# Patient Record
Sex: MALE | Race: WHITE | Employment: OTHER | URBAN - METROPOLITAN AREA
[De-identification: names, ages, dates, MRNs, and addresses within clinical notes are randomized per-mention and may not be internally consistent; named-entity substitution may affect disease eponyms.]

---

## 2019-06-24 ENCOUNTER — OFFICE VISIT (OUTPATIENT)
Dept: FAMILY MEDICINE CLINIC | Age: 59
End: 2019-06-24
Payer: COMMERCIAL

## 2019-06-24 VITALS
WEIGHT: 186 LBS | BODY MASS INDEX: 27.55 KG/M2 | SYSTOLIC BLOOD PRESSURE: 122 MMHG | HEIGHT: 69 IN | TEMPERATURE: 98.1 F | OXYGEN SATURATION: 98 % | DIASTOLIC BLOOD PRESSURE: 70 MMHG | HEART RATE: 76 BPM

## 2019-06-24 DIAGNOSIS — Z96.643 HISTORY OF BILATERAL HIP REPLACEMENTS: ICD-10-CM

## 2019-06-24 DIAGNOSIS — Z76.89 ENCOUNTER TO ESTABLISH CARE WITH NEW DOCTOR: Primary | ICD-10-CM

## 2019-06-24 DIAGNOSIS — I10 ESSENTIAL HYPERTENSION: ICD-10-CM

## 2019-06-24 PROCEDURE — 99203 OFFICE O/P NEW LOW 30 MIN: CPT | Performed by: FAMILY MEDICINE

## 2019-06-24 PROCEDURE — 1036F TOBACCO NON-USER: CPT | Performed by: FAMILY MEDICINE

## 2019-06-24 PROCEDURE — G8419 CALC BMI OUT NRM PARAM NOF/U: HCPCS | Performed by: FAMILY MEDICINE

## 2019-06-24 PROCEDURE — G8427 DOCREV CUR MEDS BY ELIG CLIN: HCPCS | Performed by: FAMILY MEDICINE

## 2019-06-24 PROCEDURE — 3017F COLORECTAL CA SCREEN DOC REV: CPT | Performed by: FAMILY MEDICINE

## 2019-06-24 RX ORDER — LISINOPRIL 10 MG/1
10 TABLET ORAL DAILY
Qty: 30 TABLET | Refills: 2 | Status: SHIPPED | OUTPATIENT
Start: 2019-06-24

## 2019-06-24 RX ORDER — LISINOPRIL 20 MG/1
20 TABLET ORAL DAILY
COMMUNITY
End: 2019-06-24

## 2019-06-24 SDOH — HEALTH STABILITY: MENTAL HEALTH: HOW OFTEN DO YOU HAVE A DRINK CONTAINING ALCOHOL?: MONTHLY OR LESS

## 2019-06-24 ASSESSMENT — PATIENT HEALTH QUESTIONNAIRE - PHQ9
SUM OF ALL RESPONSES TO PHQ9 QUESTIONS 1 & 2: 0
1. LITTLE INTEREST OR PLEASURE IN DOING THINGS: 0
SUM OF ALL RESPONSES TO PHQ QUESTIONS 1-9: 0
2. FEELING DOWN, DEPRESSED OR HOPELESS: 0
SUM OF ALL RESPONSES TO PHQ QUESTIONS 1-9: 0

## 2019-06-24 NOTE — PROGRESS NOTES
[facility]  Office Progress Note - Dr. Bre Nelson  6/24/19    Chief Complaint   Patient presents with    [facility]        S:   Patient presents to [facility] care. Hypertension  Follow-up  Blood pressure is controlled today. BP Readings from Last 3 Encounters:   06/24/19 122/70   He is taking 20 mg of lisinopril every second or third day. He said splitting the pill seemed to still occasionally cause him some low blood pressure symptoms. Denies CP, sob, abd pain, headaches, vision changes, dizziness, hypotensive symptoms. No side effects from medications noted. Chronic hip pains   Degenerative joint disease, believe probably secondary to extensive wrestling history. He had bilateral hip replacements from April 2017 through April 2018. He also had a knee surgery. Had initial surgery, reaction to the anesthesia (and possibly lisinopril), ended up with very low BP, ended up in the ICU, recovered. Took a lot out of him. Felt like mood changed a lot after surgery. 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 Relationship suffered. Ultimately ended with his girlfriend/fiancé ending the relationship. Then mood evened out. [facility] called it Post-op adjustment disorder / and also wondering about reaction to the opiates. No other specific concerns or problems today.     Family History   Problem Relation Age of Onset    Other Mother         Myotonic dystrophy 2 (patient does not have)    High Blood Pressure Father        Past Surgical History:   Procedure Laterality Date    JOINT REPLACEMENT Bilateral     hip     KNEE CARTILAGE SURGERY Right        Social History     Tobacco Use    Smoking status: Never Smoker    Smokeless tobacco: Never Used   Substance Use Topics    Alcohol use: Not Currently     Frequency: Monthly or less    Drug use: Never       ROS:  No CP, No palpitations,   No sob, No cough,   No abd pain, No heartburn,   No headaches,   No tingling, No numbness, No weakness,   No bowel changes, No hematochezia, No melena,  No bladder changes, No hematuria  No skin rashes, No skin lesions. No vision changes, No hearing changes,   No polyuria, polydipsia, polyphagia. Stable mood. ROS otherwise negative unless as listed in HPI. Chart reviewed and updated where appropriate for PMH, Fam, and Soc Hx. Physical Exam   /70 (Site: Right Upper Arm, Position: Sitting, Cuff Size: Large Adult)   Pulse 76   Temp 98.1 °F (36.7 °C) (Oral)   Ht 5' 9\" (1.753 m)   Wt 186 lb (84.4 kg)   SpO2 98%   BMI 27.47 kg/m²   Wt Readings from Last 3 Encounters:   06/24/19 186 lb (84.4 kg)       Constitutional:    He is oriented to person, place, and time. He appears well-developed and well-nourished. HENT:    Nose: Nose normal.    Mouth/Throat: Oropharynx is clear and moist.   Eyes:    Conjunctivae are normal.    Pupils are equal, round, and reactive to light. EOMI. Neck:    Normal range of motion. No thyromegaly or nodules noted. No bruit. Cardiovascular:    Normal rate, regular rhythm and normal heart sounds. No murmur. No gallop and no friction rub. Pulmonary/Chest:    Effort normal and breath sounds normal.    No wheezes. No rales or rhonchi. Abdominal:    Soft. Bowel sounds are normal.    No distension. No tenderness. Musculoskeletal:    Transitions positions frequently. Sitting to standing. No joint swelling noted. No peripheral edema. Skin:    Skin is warm and dry. No rashes, lesions. Psychiatric:    He has a normal mood and affect. Normal groom and dress. No current outpatient medications on file prior to visit. No current facility-administered medications on file prior to visit. Patient Active Problem List   Diagnosis Code    History of bilateral hip replacements Z96.643    Essential hypertension I10       Christopher / Gueroinez Barnett was seen today for establish care.     Diagnoses and all orders for this visit:    Encounter to establish care with new doctor    Essential hypertension  -     lisinopril (PRINIVIL;ZESTRIL) 10 MG tablet; Take 1 tablet by mouth daily For blood pressure. Lower dose of lisinopril from 20 mg to 10 mg due to perceived hypertensive symptoms at 20 mg daily. Compliance has been poor secondary to that problem. History of bilateral hip replacements  Doing better than previous, but still with pains. He may need a local orthopedic surgeon. Declines colon cancer screening at this time. Patient counseled to follow up sooner or seek more acute care if symptoms worsening. Electronically signed by Yong Riddle MD on 6/24/2019    This note may have been created using dictation software.  Efforts were made to reduce grammatical or syntax errors, but some may persist.

## 2019-07-22 ENCOUNTER — TELEPHONE (OUTPATIENT)
Dept: FAMILY MEDICINE CLINIC | Age: 59
End: 2019-07-22

## 2019-08-15 ENCOUNTER — OFFICE VISIT (OUTPATIENT)
Dept: FAMILY MEDICINE CLINIC | Age: 59
End: 2019-08-15
Payer: COMMERCIAL

## 2019-08-15 VITALS
SYSTOLIC BLOOD PRESSURE: 100 MMHG | DIASTOLIC BLOOD PRESSURE: 64 MMHG | HEIGHT: 69 IN | BODY MASS INDEX: 27.11 KG/M2 | HEART RATE: 77 BPM | WEIGHT: 183 LBS | OXYGEN SATURATION: 98 % | TEMPERATURE: 98.3 F

## 2019-08-15 DIAGNOSIS — G89.29 CHRONIC PAIN OF RIGHT KNEE: Primary | ICD-10-CM

## 2019-08-15 DIAGNOSIS — M25.561 CHRONIC PAIN OF RIGHT KNEE: Primary | ICD-10-CM

## 2019-08-15 PROCEDURE — 3017F COLORECTAL CA SCREEN DOC REV: CPT | Performed by: FAMILY MEDICINE

## 2019-08-15 PROCEDURE — 1036F TOBACCO NON-USER: CPT | Performed by: FAMILY MEDICINE

## 2019-08-15 PROCEDURE — G8419 CALC BMI OUT NRM PARAM NOF/U: HCPCS | Performed by: FAMILY MEDICINE

## 2019-08-15 PROCEDURE — 20610 DRAIN/INJ JOINT/BURSA W/O US: CPT | Performed by: FAMILY MEDICINE

## 2019-08-15 PROCEDURE — 99213 OFFICE O/P EST LOW 20 MIN: CPT | Performed by: FAMILY MEDICINE

## 2019-08-15 PROCEDURE — G8427 DOCREV CUR MEDS BY ELIG CLIN: HCPCS | Performed by: FAMILY MEDICINE

## 2019-08-15 RX ORDER — LIDOCAINE HYDROCHLORIDE 10 MG/ML
2 INJECTION, SOLUTION INFILTRATION; PERINEURAL ONCE
Status: DISCONTINUED | OUTPATIENT
Start: 2019-08-15 | End: 2020-01-07

## 2019-08-15 RX ORDER — TRIAMCINOLONE ACETONIDE 40 MG/ML
40 INJECTION, SUSPENSION INTRA-ARTICULAR; INTRAMUSCULAR ONCE
Status: DISCONTINUED | OUTPATIENT
Start: 2019-08-15 | End: 2020-01-07

## 2019-08-15 NOTE — PROGRESS NOTES
[facility]  Office Progress Note - Dr. Justin Ramirez  8/15/19    Chief Complaint   Patient presents with    Injections     right knee cotisone injections        S: Hx of chronic right knee pain  Hx of arthroscopic surgery for Hx of meniscus tear. Last injections were done in January of both knees. Would like to have right knee injected today. No problems with the left side. Riding bicycle 20-50 miles daily. BP on the low side recently. Did lower dose to 10mg at last appt. Did make that change. Has been taking med about every other day. Says he took it 2 days back to back recently and he thinks that sweats running low today. Family History   Problem Relation Age of Onset    Other Mother         Myotonic dystrophy 2 (patient does not have)    High Blood Pressure Father        Past Surgical History:   Procedure Laterality Date    JOINT REPLACEMENT Bilateral     hip     KNEE CARTILAGE SURGERY Right        Social History     Tobacco Use    Smoking status: Never Smoker    Smokeless tobacco: Never Used   Substance Use Topics    Alcohol use: Not Currently     Frequency: Monthly or less    Drug use: Never       ROS:  No CP, No palpitations,   No sob, No cough,   No abd pain, No heartburn,   No headaches,   No tingling, No numbness, No weakness,   No bowel changes, No hematochezia, No melena,  No bladder changes, No hematuria  No skin rashes, No skin lesions. No vision changes, No hearing changes,   No polyuria, polydipsia, polyphagia. Stable mood. ROS otherwise negative unless as listed in HPI. Chart reviewed and updated where appropriate for PMH, Fam, and Soc Hx.     Physical Exam   /64 (Site: Left Upper Arm, Position: Sitting, Cuff Size: Medium Adult)   Pulse 77   Temp 98.3 °F (36.8 °C) (Oral)   Ht 5' 9\" (1.753 m)   Wt 183 lb (83 kg)   SpO2 98%   BMI 27.02 kg/m²   Wt Readings from Last 3 Encounters:   08/15/19 183 lb (83 kg)   06/24/19 186 lb (84.4 kg) swab.  This was then again cleansed twice with alcohol wipe centrally over the area of injection. A 25-gauge 1/2 inch needle was inserted smoothly into the joint space. The plunger was pulled back and no red flash was seen. No significant effusion was withdrawn. A mixture of 2 mL of 1% lidocaine without epinephrine and 40 mg/1 mL of triamcinolone was injected into the joint without meeting resistance. The needle was withdrawn. Hemostasis was obtained with gentle pressure. A Band-Aid was placed over the knee joint. His knee was taken through an active range of motion. No problems were encountered. Patient was ambulatory out of the office. Of other unrelated note, patient approached me about changing his hippa form. He has a social issue going on where he has been, he feels, ostracized by a social community due to changes in his behavior following 3 complicated surgeries and a diagnosed adjustment reaction following the surgeries. I am a member of that social community and he was asking me about giving me permission to share his health information with members of that social community. (He brought this up at his last appointment as well.)  I ultimately told him I did not think it would be in his interest to add members of that social community to his HIPAA form, as it would not be appropriate and not in the spirit of the HIPAA form; and that even if they were on his HIPAA form and approached me about his health information, I would not feel comfortable sharing it. I told him in no uncertain terms that I do not want to manage that social situation as I do not feel it is part of my professional responsibility to him. I advised him that I would be happy to be his physician, but want no part of the social issues that have occurred and no role in trying to correct it. Patient counseled to follow up sooner or seek more acute care if symptoms worsening.      Electronically signed by Hiram Delgado

## 2019-10-14 ENCOUNTER — OFFICE VISIT (OUTPATIENT)
Dept: FAMILY MEDICINE CLINIC | Age: 59
End: 2019-10-14
Payer: COMMERCIAL

## 2019-10-14 VITALS
HEIGHT: 69 IN | HEART RATE: 84 BPM | WEIGHT: 194 LBS | TEMPERATURE: 97.3 F | BODY MASS INDEX: 28.73 KG/M2 | OXYGEN SATURATION: 98 % | SYSTOLIC BLOOD PRESSURE: 140 MMHG | DIASTOLIC BLOOD PRESSURE: 88 MMHG
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
## 2019-10-14 DIAGNOSIS — S06.0X0A CONCUSSION WITHOUT LOSS OF CONSCIOUSNESS, INITIAL ENCOUNTER: ICD-10-CM

## 2019-10-14 PROCEDURE — 1036F TOBACCO NON-USER: CPT | Performed by: FAMILY MEDICINE

## 2019-10-14 PROCEDURE — 90686 IIV4 VACC NO PRSV 0.5 ML IM: CPT | Performed by: FAMILY MEDICINE

## 2019-10-14 PROCEDURE — 99213 OFFICE O/P EST LOW 20 MIN: CPT | Performed by: FAMILY MEDICINE

## 2019-10-14 PROCEDURE — G8482 FLU IMMUNIZE ORDER/ADMIN: HCPCS | Performed by: FAMILY MEDICINE

## 2019-10-14 PROCEDURE — G8419 CALC BMI OUT NRM PARAM NOF/U: HCPCS | Performed by: FAMILY MEDICINE

## 2019-10-14 PROCEDURE — 3017F COLORECTAL CA SCREEN DOC REV: CPT | Performed by: FAMILY MEDICINE

## 2019-10-14 PROCEDURE — G8427 DOCREV CUR MEDS BY ELIG CLIN: HCPCS | Performed by: FAMILY MEDICINE

## 2019-10-14 PROCEDURE — 90471 IMMUNIZATION ADMIN: CPT | Performed by: FAMILY MEDICINE

## 2019-11-18 ENCOUNTER — HOSPITAL ENCOUNTER (OUTPATIENT)
Age: 59
Discharge: HOME | End: 2019-11-20
Payer: COMMERCIAL

## 2019-11-18 ENCOUNTER — OFFICE VISIT (OUTPATIENT)
Dept: FAMILY MEDICINE CLINIC | Age: 59
End: 2019-11-18
Payer: COMMERCIAL

## 2019-11-18 VITALS
OXYGEN SATURATION: 98 % | TEMPERATURE: 97.4 F | SYSTOLIC BLOOD PRESSURE: 120 MMHG | WEIGHT: 197 LBS | HEIGHT: 69 IN | HEART RATE: 117 BPM | DIASTOLIC BLOOD PRESSURE: 84 MMHG | BODY MASS INDEX: 29.18 KG/M2

## 2019-11-18 DIAGNOSIS — M25.551 CHRONIC BILATERAL HIP PAIN AFTER TOTAL REPLACEMENT OF BOTH HIP JOINTS: ICD-10-CM

## 2019-11-18 DIAGNOSIS — R25.2 MUSCLE CRAMPS: ICD-10-CM

## 2019-11-18 DIAGNOSIS — M25.552 CHRONIC BILATERAL HIP PAIN AFTER TOTAL REPLACEMENT OF BOTH HIP JOINTS: ICD-10-CM

## 2019-11-18 DIAGNOSIS — R25.2 MUSCLE CRAMPS: Primary | ICD-10-CM

## 2019-11-18 DIAGNOSIS — Z96.643 CHRONIC BILATERAL HIP PAIN AFTER TOTAL REPLACEMENT OF BOTH HIP JOINTS: ICD-10-CM

## 2019-11-18 DIAGNOSIS — N41.0 ACUTE PROSTATITIS: ICD-10-CM

## 2019-11-18 DIAGNOSIS — G89.28 CHRONIC BILATERAL HIP PAIN AFTER TOTAL REPLACEMENT OF BOTH HIP JOINTS: ICD-10-CM

## 2019-11-18 DIAGNOSIS — E78.00 ELEVATED CHOLESTEROL: ICD-10-CM

## 2019-11-18 DIAGNOSIS — H92.02 LEFT EAR PAIN: ICD-10-CM

## 2019-11-18 LAB
ALBUMIN SERPL-MCNC: 4.6 G/DL (ref 3.5–5.2)
ALP BLD-CCNC: 67 U/L (ref 40–129)
ALT SERPL-CCNC: 27 U/L (ref 0–40)
ANION GAP SERPL CALCULATED.3IONS-SCNC: 16 MMOL/L (ref 7–16)
AST SERPL-CCNC: 22 U/L (ref 0–39)
BASOPHILS ABSOLUTE: 0.07 E9/L (ref 0–0.2)
BASOPHILS RELATIVE PERCENT: 0.7 % (ref 0–2)
BILIRUB SERPL-MCNC: 0.4 MG/DL (ref 0–1.2)
BUN BLDV-MCNC: 12 MG/DL (ref 6–20)
C-REACTIVE PROTEIN: 0.2 MG/DL (ref 0–0.4)
CALCIUM SERPL-MCNC: 10 MG/DL (ref 8.6–10.2)
CHLORIDE BLD-SCNC: 104 MMOL/L (ref 98–107)
CHOLESTEROL, TOTAL: 141 MG/DL (ref 0–199)
CO2: 25 MMOL/L (ref 22–29)
CREAT SERPL-MCNC: 1.1 MG/DL (ref 0.7–1.2)
EOSINOPHILS ABSOLUTE: 0.14 E9/L (ref 0.05–0.5)
EOSINOPHILS RELATIVE PERCENT: 1.4 % (ref 0–6)
GFR AFRICAN AMERICAN: >60
GFR NON-AFRICAN AMERICAN: >60 ML/MIN/1.73
GLUCOSE BLD-MCNC: 102 MG/DL (ref 74–99)
HCT VFR BLD CALC: 49.9 % (ref 37–54)
HDLC SERPL-MCNC: 42 MG/DL
HEMOGLOBIN: 16.3 G/DL (ref 12.5–16.5)
IMMATURE GRANULOCYTES #: 0.04 E9/L
IMMATURE GRANULOCYTES %: 0.4 % (ref 0–5)
LDL CHOLESTEROL CALCULATED: 74 MG/DL (ref 0–99)
LYMPHOCYTES ABSOLUTE: 2.13 E9/L (ref 1.5–4)
LYMPHOCYTES RELATIVE PERCENT: 21.6 % (ref 20–42)
MAGNESIUM: 2.3 MG/DL (ref 1.6–2.6)
MCH RBC QN AUTO: 30.8 PG (ref 26–35)
MCHC RBC AUTO-ENTMCNC: 32.7 % (ref 32–34.5)
MCV RBC AUTO: 94.2 FL (ref 80–99.9)
MONOCYTES ABSOLUTE: 0.71 E9/L (ref 0.1–0.95)
MONOCYTES RELATIVE PERCENT: 7.2 % (ref 2–12)
NEUTROPHILS ABSOLUTE: 6.77 E9/L (ref 1.8–7.3)
NEUTROPHILS RELATIVE PERCENT: 68.7 % (ref 43–80)
PDW BLD-RTO: 13.5 FL (ref 11.5–15)
PLATELET # BLD: 265 E9/L (ref 130–450)
PMV BLD AUTO: 12 FL (ref 7–12)
POTASSIUM SERPL-SCNC: 4.7 MMOL/L (ref 3.5–5)
RBC # BLD: 5.3 E12/L (ref 3.8–5.8)
SEDIMENTATION RATE, ERYTHROCYTE: 7 MM/HR (ref 0–15)
SODIUM BLD-SCNC: 145 MMOL/L (ref 132–146)
TOTAL CK: 152 U/L (ref 20–200)
TOTAL PROTEIN: 7.5 G/DL (ref 6.4–8.3)
TRIGL SERPL-MCNC: 123 MG/DL (ref 0–149)
VLDLC SERPL CALC-MCNC: 25 MG/DL
WBC # BLD: 9.9 E9/L (ref 4.5–11.5)

## 2019-11-18 PROCEDURE — 86140 C-REACTIVE PROTEIN: CPT

## 2019-11-18 PROCEDURE — 85025 COMPLETE CBC W/AUTO DIFF WBC: CPT

## 2019-11-18 PROCEDURE — 80061 LIPID PANEL: CPT

## 2019-11-18 PROCEDURE — 36415 COLL VENOUS BLD VENIPUNCTURE: CPT

## 2019-11-18 PROCEDURE — G8419 CALC BMI OUT NRM PARAM NOF/U: HCPCS | Performed by: FAMILY MEDICINE

## 2019-11-18 PROCEDURE — 85651 RBC SED RATE NONAUTOMATED: CPT

## 2019-11-18 PROCEDURE — G8427 DOCREV CUR MEDS BY ELIG CLIN: HCPCS | Performed by: FAMILY MEDICINE

## 2019-11-18 PROCEDURE — 83735 ASSAY OF MAGNESIUM: CPT

## 2019-11-18 PROCEDURE — 3017F COLORECTAL CA SCREEN DOC REV: CPT | Performed by: FAMILY MEDICINE

## 2019-11-18 PROCEDURE — 1036F TOBACCO NON-USER: CPT | Performed by: FAMILY MEDICINE

## 2019-11-18 PROCEDURE — 99214 OFFICE O/P EST MOD 30 MIN: CPT | Performed by: FAMILY MEDICINE

## 2019-11-18 PROCEDURE — 80053 COMPREHEN METABOLIC PANEL: CPT

## 2019-11-18 PROCEDURE — G8482 FLU IMMUNIZE ORDER/ADMIN: HCPCS | Performed by: FAMILY MEDICINE

## 2019-11-18 PROCEDURE — 82550 ASSAY OF CK (CPK): CPT

## 2019-11-18 RX ORDER — SULFAMETHOXAZOLE AND TRIMETHOPRIM 800; 160 MG/1; MG/1
1 TABLET ORAL 2 TIMES DAILY
Qty: 28 TABLET | Refills: 0 | Status: SHIPPED | OUTPATIENT
Start: 2019-11-18 | End: 2019-12-02

## 2020-01-02 ENCOUNTER — TELEPHONE (OUTPATIENT)
Dept: FAMILY MEDICINE CLINIC | Age: 60
End: 2020-01-02

## 2020-01-02 NOTE — TELEPHONE ENCOUNTER
Shanice Levin from the [facility] dental clinic called stating the pt needs to have a tooth pulled and the pt states he does not need to take any medications prior to the procedure. Shanice Levin states she is going to fax over a clearance form.

## 2020-01-06 ENCOUNTER — OFFICE VISIT (OUTPATIENT)
Dept: FAMILY MEDICINE CLINIC | Age: 60
End: 2020-01-06
Payer: COMMERCIAL

## 2020-01-06 VITALS
BODY MASS INDEX: 29.62 KG/M2 | DIASTOLIC BLOOD PRESSURE: 88 MMHG | OXYGEN SATURATION: 98 % | TEMPERATURE: 96.9 F | HEIGHT: 69 IN | HEART RATE: 80 BPM | WEIGHT: 200 LBS | SYSTOLIC BLOOD PRESSURE: 120 MMHG

## 2020-01-06 PROCEDURE — G8419 CALC BMI OUT NRM PARAM NOF/U: HCPCS | Performed by: FAMILY MEDICINE

## 2020-01-06 PROCEDURE — G8427 DOCREV CUR MEDS BY ELIG CLIN: HCPCS | Performed by: FAMILY MEDICINE

## 2020-01-06 PROCEDURE — G8482 FLU IMMUNIZE ORDER/ADMIN: HCPCS | Performed by: FAMILY MEDICINE

## 2020-01-06 PROCEDURE — 1036F TOBACCO NON-USER: CPT | Performed by: FAMILY MEDICINE

## 2020-01-06 PROCEDURE — 99214 OFFICE O/P EST MOD 30 MIN: CPT | Performed by: FAMILY MEDICINE

## 2020-01-06 PROCEDURE — 20610 DRAIN/INJ JOINT/BURSA W/O US: CPT | Performed by: FAMILY MEDICINE

## 2020-01-06 PROCEDURE — 3017F COLORECTAL CA SCREEN DOC REV: CPT | Performed by: FAMILY MEDICINE

## 2020-01-06 ASSESSMENT — PATIENT HEALTH QUESTIONNAIRE - PHQ9
SUM OF ALL RESPONSES TO PHQ9 QUESTIONS 1 & 2: 0
2. FEELING DOWN, DEPRESSED OR HOPELESS: 0
1. LITTLE INTEREST OR PLEASURE IN DOING THINGS: 0
SUM OF ALL RESPONSES TO PHQ QUESTIONS 1-9: 0
SUM OF ALL RESPONSES TO PHQ QUESTIONS 1-9: 0

## 2020-01-06 NOTE — PROGRESS NOTES
[facility]  Office Progress Note - Dr. Caity Ramirez  1/6/20    Chief Complaint   Patient presents with    Knee Pain     bilateral knee/hip pain    Hip Pain        S: Follow-up chronic knee and hip pain  Patient complains of persistent bilateral hip pain, worse on the right and the location is actually in the posterior lateral buttock. Also tender to palpation over the SI joint area. Patient notes that pain is dull and deep. He does cycle regularly, stating that he can ride up to 40 miles daily. He also walks for 3 miles or so in the evening. He has a history of bilateral hip joint replacement. He is concerned about infection, but has not had any constitutional symptoms, fevers, and had a negative sed rate and WBC count previously for this concern. He also continues to complain of right knee pain. He has known history of meniscus repair. Probably originally injured the joint decades ago, possibly with wrestling. He notes pain with increased activity. Pain is medially and laterally at the distal sides of the patella. Some pain over the medial joint line. We injected this knee in August 2019. Patient had some relief. He is strongly requesting knee injection again today. Initially he thought he wanted to do the left side, but he notes that the right is more problematic. This has not recently been evaluated by orthopedics. We discussed musculoskeletal connection and that knee pain could potentially be causing hip pain, or vice versa. He is not having any groin pain or pain with internal or external rotation of the hip. Given patient's strong preference to proceed with right knee injection, we elected to do that today. Patient has had 3 or 4 injections in his knees before. He understands risks versus benefits versus alternatives. We will also be pursuing orthopedic work-up concurrently.   Offered him the option of waiting to see orthopedics for injection, but he would skin lesions. No vision changes, No hearing changes,   No polyuria, polydipsia, polyphagia. Stable mood. ROS otherwise negative unless as listed in HPI. Chart reviewed and updated where appropriate for PMH, Fam, and Soc Hx. Physical Exam   /88 (Site: Left Upper Arm, Position: Sitting, Cuff Size: Medium Adult)   Pulse 80   Temp 96.9 °F (36.1 °C) (Temporal)   Ht 5' 9\" (1.753 m)   Wt 200 lb (90.7 kg)   SpO2 98%   BMI 29.53 kg/m²   Wt Readings from Last 3 Encounters:   01/06/20 200 lb (90.7 kg)   11/18/19 197 lb (89.4 kg)   10/14/19 194 lb (88 kg)       Constitutional:    He is oriented to person, place, and time. He appears well-developed and well-nourished. HENT:    Nose: Nose normal.    Mouth/Throat: Oropharynx is clear and moist.   Eyes:    Conjunctivae are normal.    Pupils are equal, round, and reactive to light. EOMI. Cardiovascular:    Normal rate, regular rhythm and normal heart sounds. No murmur. No gallop and no friction rub. Pulmonary/Chest:    Effort normal and breath sounds normal.    No wheezes. No rales or rhonchi. Musculoskeletal:    Normal range of motion. Tenderness to palpation of the musculature over the buttock posterior laterally. Possibly overlying the SI joint or slightly farther lateral over the most of the musculature. No tenderness over the trochanteric bursa. No significant tenderness in the groin with internal or external rotation. Able to transition on and off of the exam table without issue. Right knee exam with slight crepitus on flexion and extension. No locking or catching. Ligaments with firm endpoints. No significant effusion. Some tenderness to palpation inferiorly on the medial and lateral sides of the patella, overlying the femoral condyles and medial joint line. No joint swelling noted. No peripheral edema. Neurological:    He is alert and oriented to person, place, and time. Motor and sensation grossly intact.      Normal up sooner or seek more acute care if symptoms worsening. Electronically signed by Franc Marcos MD on 1/7/2020    This note may have been created using dictation software.  Efforts were made to reduce grammatical or syntax errors, but some may persist.

## 2020-01-07 RX ORDER — LIDOCAINE HYDROCHLORIDE 10 MG/ML
2 INJECTION, SOLUTION EPIDURAL; INFILTRATION; INTRACAUDAL; PERINEURAL ONCE
Status: SHIPPED | OUTPATIENT
Start: 2020-01-06

## 2020-01-07 RX ORDER — TRIAMCINOLONE ACETONIDE 40 MG/ML
40 INJECTION, SUSPENSION INTRA-ARTICULAR; INTRAMUSCULAR ONCE
Status: SHIPPED | OUTPATIENT
Start: 2020-01-06